# Patient Record
Sex: MALE | NOT HISPANIC OR LATINO | URBAN - METROPOLITAN AREA
[De-identification: names, ages, dates, MRNs, and addresses within clinical notes are randomized per-mention and may not be internally consistent; named-entity substitution may affect disease eponyms.]

---
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
## 2024-04-12 ENCOUNTER — TELEPHONE (OUTPATIENT)
Dept: OPTOMETRY | Facility: CLINIC | Age: 19
End: 2024-04-12
Payer: MEDICAID

## 2024-04-15 ENCOUNTER — OFFICE VISIT (OUTPATIENT)
Dept: OPTOMETRY | Facility: CLINIC | Age: 19
End: 2024-04-15
Payer: MEDICAID

## 2024-04-15 DIAGNOSIS — H01.02A SQUAMOUS BLEPHARITIS OF UPPER AND LOWER EYELIDS OF BOTH EYES: Primary | ICD-10-CM

## 2024-04-15 DIAGNOSIS — H40.013 OPEN ANGLE WITH BORDERLINE FINDINGS OF BOTH EYES: ICD-10-CM

## 2024-04-15 DIAGNOSIS — H01.02B SQUAMOUS BLEPHARITIS OF UPPER AND LOWER EYELIDS OF BOTH EYES: Primary | ICD-10-CM

## 2024-04-15 PROCEDURE — 92004 COMPRE OPH EXAM NEW PT 1/>: CPT | Mod: S$PBB,,, | Performed by: OPTOMETRIST

## 2024-04-15 PROCEDURE — 99999 PR PBB SHADOW E&M-EST. PATIENT-LVL I: CPT | Mod: PBBFAC,,, | Performed by: OPTOMETRIST

## 2024-04-15 PROCEDURE — 99211 OFF/OP EST MAY X REQ PHY/QHP: CPT | Mod: PBBFAC | Performed by: OPTOMETRIST

## 2024-04-15 PROCEDURE — 1160F RVW MEDS BY RX/DR IN RCRD: CPT | Mod: CPTII,,, | Performed by: OPTOMETRIST

## 2024-04-15 PROCEDURE — 1159F MED LIST DOCD IN RCRD: CPT | Mod: CPTII,,, | Performed by: OPTOMETRIST

## 2024-04-15 NOTE — PROGRESS NOTES
STEPHEN MAGANA: a year ago   Chief complaint (CC): 18 yo M here for annual eye exam. Patient states for   about a year, he has been experiencing Redness, puffiness, tearing, mucous   and discharge from OU and has not seen much improvement since.    Glasses? -  Contacts? -  H/o eye surgery, injections or laser: -  H/o eye injury: -  Known eye conditions? -  Family h/o eye conditions? -  Eye gtts? Patient was prescribed drop in the past for eye issue but does   not remember the drops   States drops worked for a while but then ceased to help the issue.       (-) Flashes (-)  Floaters (++) Mucous   (+)  Tearing (+) Itching (+) Burning   (-) Headaches (+) Eye Pain/discomfort (+) Irritation   (+)  Redness (-) Double vision (-) Blurry vision    Diabetic? -  A1c? -      Last edited by Jarrell Simon, OD on 4/15/2024 12:58 PM.            Assessment /Plan     For exam results, see Encounter Report.      Squamous blepharitis of upper and lower eyelids of both eyes   - Pt educated on condition. Initiate lid scrubs BID OU with baby shampoo Ocusoft lid scrubs. Cool compresses when lids become swollen.     Open angle with borderline findings of both eyes   - Secondary to ONH appearance OU  - IOP normotensive OU today    - RTC for baseline OCT RNFL and VF 24-2 SF OU.

## 2024-05-13 ENCOUNTER — OFFICE VISIT (OUTPATIENT)
Dept: OPTOMETRY | Facility: CLINIC | Age: 19
End: 2024-05-13
Payer: MEDICAID

## 2024-05-13 ENCOUNTER — CLINICAL SUPPORT (OUTPATIENT)
Dept: OPHTHALMOLOGY | Facility: CLINIC | Age: 19
End: 2024-05-13
Payer: MEDICAID

## 2024-05-13 DIAGNOSIS — H40.013 OPEN ANGLE WITH BORDERLINE FINDINGS OF BOTH EYES: Primary | ICD-10-CM

## 2024-05-13 PROCEDURE — 1159F MED LIST DOCD IN RCRD: CPT | Mod: CPTII,,, | Performed by: OPTOMETRIST

## 2024-05-13 PROCEDURE — 99999 PR PBB SHADOW E&M-EST. PATIENT-LVL II: CPT | Mod: PBBFAC,,, | Performed by: OPTOMETRIST

## 2024-05-13 PROCEDURE — 1160F RVW MEDS BY RX/DR IN RCRD: CPT | Mod: CPTII,,, | Performed by: OPTOMETRIST

## 2024-05-13 PROCEDURE — 92083 EXTENDED VISUAL FIELD XM: CPT | Mod: PBBFAC | Performed by: OPTOMETRIST

## 2024-05-13 PROCEDURE — 92012 INTRM OPH EXAM EST PATIENT: CPT | Mod: S$PBB,,, | Performed by: OPTOMETRIST

## 2024-05-13 PROCEDURE — 92133 CPTRZD OPH DX IMG PST SGM ON: CPT | Mod: PBBFAC | Performed by: OPTOMETRIST

## 2024-05-13 PROCEDURE — 99212 OFFICE O/P EST SF 10 MIN: CPT | Mod: PBBFAC | Performed by: OPTOMETRIST

## 2024-05-13 NOTE — PROGRESS NOTES
"HPI    IZZY: 4/15/24 w/ Dr. Simon  Chief complaint (CC): 20 yo M presents for follow up. Patient states   redness, tearing, swelling, and discharge has improved since last office   visit. States he only has tearing and discharge when he "cold air is   blowing."  Glasses? -  Contacts? -  H/o eye surgery, injections or laser: -  H/o eye injury: -  Known eye conditions? -  Family h/o eye conditions? -  Eye gtts? Lid scrubs BID OU  "Blue" drop TID OU      (-) Flashes (-)  Floaters (-) Mucous   (-)  Tearing (-) Itching (-) Burning   (-) Headaches (-) Eye Pain/discomfort (-) Irritation   (-)  Redness (-) Double vision (-) Blurry vision    Diabetic? -  A1c? -      Last edited by Dominique Edouard on 5/13/2024  9:41 AM.            Assessment /Plan     For exam results, see Encounter Report.      Open angle with borderline findings of both eyes  - IOP normotensive OU today (18/15)  - VF today (5/13/24) OD Reliable; superior arcuate detect not correlating with OCT findings  OS moderately reliable due to high FP; scattered central non specific defects  Pt reports difficulty during testing- 1st time VF taker   - OCT RNFL (5/13/24) WNL OU with robust RNFL OU    - Monitor without glaucoma meds at this time   - RTC 1 year for annual CEE/OCT RNFL.                  "